# Patient Record
Sex: FEMALE | Race: WHITE
[De-identification: names, ages, dates, MRNs, and addresses within clinical notes are randomized per-mention and may not be internally consistent; named-entity substitution may affect disease eponyms.]

---

## 2017-01-03 ENCOUNTER — HOSPITAL ENCOUNTER (OUTPATIENT)
Dept: HOSPITAL 47 - LABWHC1 | Age: 22
Discharge: HOME | End: 2017-01-03
Payer: COMMERCIAL

## 2017-01-03 DIAGNOSIS — Z34.02: Primary | ICD-10-CM

## 2017-01-03 DIAGNOSIS — Z3A.00: ICD-10-CM

## 2017-01-03 LAB
CH: 29.5
CHCM: 32.9
ERYTHROCYTE [DISTWIDTH] IN BLOOD BY AUTOMATED COUNT: 3.51 M/UL (ref 3.8–5.4)
ERYTHROCYTE [DISTWIDTH] IN BLOOD: 12.5 % (ref 11.5–15.5)
HCT VFR BLD AUTO: 31.7 % (ref 34–46)
HDW: 2.81
HGB BLD-MCNC: 10.4 GM/DL (ref 11.4–16)
MCH RBC QN AUTO: 29.5 PG (ref 25–35)
MCHC RBC AUTO-ENTMCNC: 32.7 G/DL (ref 31–37)
MCV RBC AUTO: 90.3 FL (ref 80–100)
WBC # BLD AUTO: 9.3 K/UL (ref 4–11)

## 2017-01-03 PROCEDURE — 86870 RBC ANTIBODY IDENTIFICATION: CPT

## 2017-01-03 PROCEDURE — 85027 COMPLETE CBC AUTOMATED: CPT

## 2017-01-03 PROCEDURE — 86850 RBC ANTIBODY SCREEN: CPT

## 2017-01-03 PROCEDURE — 82950 GLUCOSE TEST: CPT

## 2017-01-03 PROCEDURE — 36415 COLL VENOUS BLD VENIPUNCTURE: CPT

## 2017-01-03 PROCEDURE — 86880 COOMBS TEST DIRECT: CPT

## 2017-03-24 ENCOUNTER — HOSPITAL ENCOUNTER (INPATIENT)
Dept: HOSPITAL 47 - 4FBP | Age: 22
LOS: 2 days | Discharge: HOME | End: 2017-03-26
Payer: COMMERCIAL

## 2017-03-24 VITALS — RESPIRATION RATE: 16 BRPM

## 2017-03-24 VITALS — BODY MASS INDEX: 27.4 KG/M2

## 2017-03-24 DIAGNOSIS — Z3A.40: ICD-10-CM

## 2017-03-24 DIAGNOSIS — O48.0: ICD-10-CM

## 2017-03-24 DIAGNOSIS — F32.9: ICD-10-CM

## 2017-03-24 DIAGNOSIS — F41.9: ICD-10-CM

## 2017-03-24 DIAGNOSIS — Z23: ICD-10-CM

## 2017-03-24 LAB
BASOPHILS # BLD AUTO: 0 K/UL (ref 0–0.2)
BASOPHILS NFR BLD AUTO: 0 %
CH: 27
CHCM: 32.3
EOSINOPHIL # BLD AUTO: 0.3 K/UL (ref 0–0.7)
EOSINOPHIL NFR BLD AUTO: 2 %
ERYTHROCYTE [DISTWIDTH] IN BLOOD BY AUTOMATED COUNT: 3.74 M/UL (ref 3.8–5.4)
ERYTHROCYTE [DISTWIDTH] IN BLOOD: 13.1 % (ref 11.5–15.5)
HCT VFR BLD AUTO: 31.4 % (ref 34–46)
HDW: 3.27
HGB BLD-MCNC: 10.2 GM/DL (ref 11.4–16)
LUC NFR BLD AUTO: 3 %
LYMPHOCYTES # SPEC AUTO: 2.6 K/UL (ref 1–4.8)
LYMPHOCYTES NFR SPEC AUTO: 24 %
MCH RBC QN AUTO: 27.2 PG (ref 25–35)
MCHC RBC AUTO-ENTMCNC: 32.4 G/DL (ref 31–37)
MCV RBC AUTO: 84 FL (ref 80–100)
MONOCYTES # BLD AUTO: 0.6 K/UL (ref 0–1)
MONOCYTES NFR BLD AUTO: 5 %
NEUTROPHILS # BLD AUTO: 6.8 K/UL (ref 1.3–7.7)
NEUTROPHILS NFR BLD AUTO: 65 %
WBC # BLD AUTO: 0.34 10*3/UL
WBC # BLD AUTO: 10.5 K/UL (ref 4–11)
WBC (PEROX): 11.5

## 2017-03-24 PROCEDURE — 10907ZC DRAINAGE OF AMNIOTIC FLUID, THERAPEUTIC FROM PRODUCTS OF CONCEPTION, VIA NATURAL OR ARTIFICIAL OPENING: ICD-10-PCS

## 2017-03-24 PROCEDURE — 90707 MMR VACCINE SC: CPT

## 2017-03-24 PROCEDURE — 3E033VJ INTRODUCTION OF OTHER HORMONE INTO PERIPHERAL VEIN, PERCUTANEOUS APPROACH: ICD-10-PCS

## 2017-03-24 PROCEDURE — 85025 COMPLETE CBC W/AUTO DIFF WBC: CPT

## 2017-03-24 PROCEDURE — 88307 TISSUE EXAM BY PATHOLOGIST: CPT

## 2017-03-24 PROCEDURE — 3E0S3NZ INTRODUCTION OF ANALGESICS, HYPNOTICS, SEDATIVES INTO EPIDURAL SPACE, PERCUTANEOUS APPROACH: ICD-10-PCS

## 2017-03-24 PROCEDURE — 0HQ9XZZ REPAIR PERINEUM SKIN, EXTERNAL APPROACH: ICD-10-PCS

## 2017-03-24 RX ADMIN — IBUPROFEN PRN MG: 600 TABLET ORAL at 17:54

## 2017-03-24 RX ADMIN — POTASSIUM CHLORIDE SCH MLS/HR: 14.9 INJECTION, SOLUTION INTRAVENOUS at 12:00

## 2017-03-24 RX ADMIN — POTASSIUM CHLORIDE SCH MLS/HR: 14.9 INJECTION, SOLUTION INTRAVENOUS at 10:44

## 2017-03-24 RX ADMIN — POTASSIUM CHLORIDE SCH MLS/HR: 14.9 INJECTION, SOLUTION INTRAVENOUS at 09:54

## 2017-03-24 RX ADMIN — DOCUSATE SODIUM AND SENNOSIDES SCH EACH: 50; 8.6 TABLET ORAL at 20:34

## 2017-03-24 RX ADMIN — POTASSIUM CHLORIDE SCH MLS/HR: 14.9 INJECTION, SOLUTION INTRAVENOUS at 06:46

## 2017-03-24 NOTE — P.PROBDLV
Vaginal Delivery Note





- .


Vaginal Delivery Note: 





Patient progressed to complete and pushing with spontaneous vaginal delivery of 

a viable male  over an intact perineum.  Following delivery of the head 

that was delivered from left occiput anterior position, it had rotated from 

essentially asynclitic position to left occiput anterior at the very end of the 

delivery.  Anterior and posterior shoulders were delivered with gentle downward 

and upward traction followed by the remainder of the baby.  Mouth nares were 

then bulb suctioned and baby was placed on mother's abdomen where the umbilical 

cord was allowed to pulsate for 30 seconds prior to clamping and cutting.  

Placenta was then delivered intact and Pitocin was added to the IV.  Nursery 

personnel was present to assume care.  Inspection of the vagina revealed a 

large avulsion of the left labia which was reapproximated with 3-0 Vicryl.  

Once this was accomplished both mother and baby were noted to be stable.  Apgar 

scores were 8 and 9 at one and 5 minutes respectively and the weight was 9 lbs. 

12 oz.

## 2017-03-24 NOTE — P.HPOB
History of Present Illness


H&P Date: 17


Chief Complaint: Pregnancy at term: Induction of labor





Aileen is a 22-year-old  at 40 weeks 3 days gestation arise for induction 

of labor.  Her pregnancy course has been unremarkable.  She is feeling well at 

this time.  Risks of induction were discussed the patient in detail.  Pertinent 

labs did include a negative blood type she did receive [place] gamma at 28 weeks.  

Rubella nonimmune.  Hepatitis B surface antigen and RPR were negative.  Group B 

strep was negative.


Vital signs are currently stable and afebrile.  Heart regular, lungs clear, 

extremities without pain.  Gravid uterus is noted.  Fetal heart tones are in 

the 140s and reactive.  She was dilated 3 Cm 80% effaced -2


 station.  Artificial rupture membranes was performed and clear fluid is noted.


Assessment intrauterine pregnancy 40 weeks gestation.  Plan expect spontaneous 

vaginal delivery.  She anticipates use of IV pain medication for pain control.





Past Medical History


Past Medical History: No Reported History


History of Any Multi-Drug Resistant Organisms: None Reported


Past Surgical History: No Surgical Hx Reported


Past Anesthesia/Blood Transfusion Reactions: Unable to Obtain


Past Psychological History: Anxiety, Depression


Smoking Status: Never smoker


Past Alcohol Use History: None Reported





- Past Family History


  ** Mother


Family Medical History: No Reported History





Medications and Allergies


 Home Medications











 Medication  Instructions  Recorded  Confirmed  Type


 


Pediatric Multivit Comb #25/FA 2 tab PO DAILY 16 History





[Flintstones Multivit Chew Tab]    











 Allergies











Allergy/AdvReac Type Severity Reaction Status Date / Time


 


No Known Allergies Allergy   Verified 17 06:39














Exam


Osteopathic Statement: *.  No significant issues noted on an osteopathic 

structural exam other than those noted in the History and Physical/Consult.





- Vital Signs


Vital signs: 


 Intake and Output











 17





 22:59 06:59 14:59


 


Other:   


 


  Weight  74.843 kg 














- OBG Physical Exam


Abdomen: bowel sounds normal, no diffuse tenderness, no bruit present, no 

guarding noted, no hepatomegaly, no splenomegaly, no mass


Vulva: both: normal


Vagina: normal moisture, no discharge


Cervix: no lesion, no discharge


Uterus: normal size, normal contour


Adnexa: both: normal





Results


Result Diagrams: 


 17 06:45





 Abnormal Lab Results - Last 24 Hours (Table)











  17 Range/Units





  06:45 


 


RBC  3.74 L  (3.80-5.40)  m/uL


 


Hgb  10.2 L  (11.4-16.0)  gm/dL


 


Hct  31.4 L  (34.0-46.0)  %

## 2017-03-25 PROCEDURE — 3E0134Z INTRODUCTION OF SERUM, TOXOID AND VACCINE INTO SUBCUTANEOUS TISSUE, PERCUTANEOUS APPROACH: ICD-10-PCS

## 2017-03-25 RX ADMIN — IBUPROFEN PRN MG: 600 TABLET ORAL at 11:35

## 2017-03-25 RX ADMIN — IBUPROFEN PRN MG: 600 TABLET ORAL at 19:33

## 2017-03-25 RX ADMIN — DOCUSATE SODIUM AND SENNOSIDES SCH: 50; 8.6 TABLET ORAL at 12:34

## 2017-03-25 NOTE — P.PNOBGVD
Subjective





- Subjective


Principal diagnosis: Postpartum day 1


Interval history: 





Patient is doing very well postpartum day 1.  She is involuting, voiding, and 

she is tolerating her diet.  She voices no complaints.


Patient reports: Reports appetite normal, Reports voiding normally, Reports 

pain well controlled, Reports ambulating normally


[place]: doing well





Objective





- Latest Vital Signs


Latest vital signs: 


 Vital Signs











  Temp Pulse Resp BP


 


 17 08:00  97.6 F  82  16  115/63


 


 17 04:00  98.6 F  83  16  90/52


 


 17 00:00  98.2 F  80  16  103/64


 


 17 21:45    16 


 


 17 19:39  98.8 F  97  16  121/64


 


 17 19:09  98 F  102 H  16  131/71


 


 17 18:39   117 H  16  125/66


 


 17 18:24   103 H  16  131/72


 


 17 18:09   107 H  16  127/71


 


 17 17:54   109 H  16  125/66


 


 17 17:39   113 H  16  123/61








 Intake and Output











 17





 22:59 06:59 14:59


 


Intake Total 110.583 100 


 


Balance 110.583 100 


 


Intake:   


 


  Intake, IV Titration 110.583  





  Amount   


 


    Oxytocin 30 Units/500 ml 110.583  





    Ns 30 unit In Saline 1   





    500ml.bag @ 1 MILLIUNIT/   





    MIN 1 mls/hr IV .Q24H ERIC   





    Rx#:[id number]   


 


  Oral  100 


 


Other:   


 


  # Voids 1  2














- Exam


Lungs: bilateral: normal


Chest: Normal S1, Normal S2


Extremities: Present: normal


Abdomen: Present: normal appearance, soft


Uterus: Present: normal, firm

## 2017-03-26 VITALS — DIASTOLIC BLOOD PRESSURE: 87 MMHG | HEART RATE: 89 BPM | TEMPERATURE: 98.5 F | SYSTOLIC BLOOD PRESSURE: 112 MMHG

## 2017-03-26 NOTE — P.DS
Providers


Date of admission: 


03/24/17 06:25





Expected date of discharge: 03/26/17


Attending physician: 


Reji Stearns





Primary care physician: 


Stated None





Hospital Course: 





Negative doing very well postpartum day 2.  She is ambulating, voiding, and she 

is tolerating her diet.  She voices no complaints.  Vital signs stable 

afebrile.  Discharge instructions were thoroughly reviewed and all questions 

are answered for her prior to her discharge.  Prescription for Motrin and a 

breast pump has been provided.  Her heart is regular, lungs are clear, 

extremities without pain.  Abdomen is soft uterus is firm below the umbilicus 

and lochia is reported to be light.  Assessment postpartum day 2.  Plan 

discharged home follow up with me in 6 weeks.


Patient Condition at Discharge: Good





Plan - Discharge Summary


New Discharge Prescriptions: 


Ibuprofen [Motrin] 600 mg PO Q6HR PRN #30 tab


 PRN Reason: Pain


Discharge Medication List





Pediatric Multivit Comb #25/FA [Flintstones Multivit Chew Tab] 2 tab PO DAILY 12 /20/16 [History]


Ibuprofen [Motrin] 600 mg PO Q6HR PRN #30 tab 03/26/17 [Rx]








Follow up Appointment(s)/Referral(s): 


Reji Stearns DO [Doctor of Osteopathic Medicine] - 6 Weeks


Activity/Diet/Wound Care/Special Instructions: 


No heavy lifting, limit stairs and driving, and pelvic rest.  If any high 

temperatures, heavy bleeding, or severe pain call my office


Discharge Disposition: HOME SELF-CARE

## 2018-07-14 ENCOUNTER — HOSPITAL ENCOUNTER (EMERGENCY)
Dept: HOSPITAL 47 - EC | Age: 23
LOS: 1 days | Discharge: HOME | End: 2018-07-15
Payer: COMMERCIAL

## 2018-07-14 VITALS
RESPIRATION RATE: 16 BRPM | SYSTOLIC BLOOD PRESSURE: 113 MMHG | DIASTOLIC BLOOD PRESSURE: 76 MMHG | TEMPERATURE: 97.9 F | HEART RATE: 84 BPM

## 2018-07-14 DIAGNOSIS — L50.9: Primary | ICD-10-CM

## 2018-07-14 PROCEDURE — 99282 EMERGENCY DEPT VISIT SF MDM: CPT

## 2018-07-15 NOTE — ED
General Adult HPI





- General


Chief complaint: Skin/Abscess/Foreign Body


Stated complaint: Rash


Time Seen by Provider: 07/14/18 23:47


Source: patient, RN notes reviewed


Mode of arrival: ambulatory


Limitations: no limitations





- History of Present Illness


Initial comments: 





Chief complaint and history of present illness is a 23-year-old female here 

with a friend.  The patient worsen 1 PM this afternoon she awakened she had 

some itchy hives across her lower abdomen on on her right lateral thigh.  

Patient denies any new foods, denies any new detergents or clothing.  No 

difficulty breathing.  She did take 25 mg Benadryl by mouth which helped 

slightly.





- Related Data


 Home Medications











 Medication  Instructions  Recorded  Confirmed


 


Pedi Multivit No.25/Folic Acid 2 tab PO DAILY 12/20/16 03/24/17





[Flintstones Multivit Chew Tab]   








 Previous Rx's











 Medication  Instructions  Recorded


 


Ibuprofen [Motrin] 600 mg PO Q6HR PRN #30 tab 03/26/17


 


predniSONE 20 mg PO DAILY #3 tab 07/15/18











 Allergies











Allergy/AdvReac Type Severity Reaction Status Date / Time


 


No Known Allergies Allergy   Verified 07/14/18 23:45














Review of Systems


ROS Statement: 


Those systems with pertinent positive or pertinent negative responses have been 

documented in the HPI.


review of systems.  The patient denies any headache no itchy eyes no visual 

acuity changes no shortness of breath no wheezing.  No GI/ problems or nausea 

no vomiting diarrhea no diarrhea.  Patient denies any chest pain pregnant.  

Patient presents with urticaria over the lower abdomen and right lateral hip 

area.





  All systems reviewed.  Patient denies any chronic medical problems.  

Nonsmoker nondrinker.  Denies any chance being pregnant.Family history 

noncontributory.


ROS Other: All systems not noted in ROS Statement are negative.





Past Medical History


Past Medical History: No Reported History


History of Any Multi-Drug Resistant Organisms: None Reported


Past Surgical History: No Surgical Hx Reported


Past Anesthesia/Blood Transfusion Reactions: Unable to Obtain


Past Psychological History: Anxiety, Depression


Smoking Status: Never smoker


Past Alcohol Use History: Rare


Past Drug Use History: None Reported





- Past Family History


  ** Mother


Family Medical History: No Reported History





General Exam





- General Exam Comments


Initial Comments: 





General:


The patient is awake and alert, here because she has hives, there it she.  

Preventing her from sleeping.  The patient did take Benadryl 25 mg this 

evening.  Vital signs shows temperature 97.9 pulse 84 respiratory rate 16 pulse 

ox 99% room air blood pressure 113/76.


Eye:


   extra-ocular movements are intact; there is normal conjunctiva bilaterally. 

No signs of icterus. 


Ears, nose, mouth and throat:


There are moist mucous membranes  


Neck:


The neck is supple, no stridor


Cardiovascular:


regular heart rate and rhythm, no murmur.


Respiratory:


Lungs are clear to auscultation, respirations are non-labored, breath sounds 

are equal. No wheezes, stridor, rales, or rhonchi.


Gastrointestinal:


patient presents with hives over the area where her belt would go.  Denies any 

new clothing or chemicals that may have caused local irritation.


 


Skin:


hives noted lateral right thigh.


 


Limitations: no limitations





Course





 Vital Signs











  07/14/18





  23:40


 


Temperature 97.9 F


 


Pulse Rate 84


 


Respiratory 16





Rate 


 


Blood Pressure 113/76


 


O2 Sat by Pulse 99





Oximetry 














Medical Decision Making





- Medical Decision Making





medical decision making; the patient is 23 years old here with a friend.  

Patient has urticaria over her lower abdomen and right lateral thigh area.  No 

cause can be determined at this time.  The patient will be placed on Benadryl, 

prednisone and Pepcid for 3 days.  Advised to review what she is eaten, which 

is washed her clothing and an put on her skin.  Advised return emergency room 

if she has any difficulties otherwise follow-up with family physician.





Disposition


Clinical Impression: 


 Urticaria





Disposition: HOME SELF-CARE


Condition: Stable


Instructions:  Urticaria (ED)


Additional Instructions: 


Do not take a hot shower as this will make the urticaria worse.  Take Benadryl 

50 mg 3 times daily for the next 3 days.  Take prednisone 20 mg once daily as 

directed for the next 3 days.  Also Pepcid 20 mg daily for the next 3 days.  

Return emergency room for development of difficulty breathing.  Otherwise follow

-up with family physician.


Prescriptions: 


predniSONE 20 mg PO DAILY #3 tab


Is patient prescribed a controlled substance at d/c from ED?: No


Referrals: 


None,Stated [Primary Care Provider] - 1-2 days


Time of Disposition: 00:08

## 2020-01-27 ENCOUNTER — HOSPITAL ENCOUNTER (INPATIENT)
Dept: HOSPITAL 47 - 4FBP | Age: 25
LOS: 1 days | Discharge: HOME | End: 2020-01-28
Attending: OBSTETRICS & GYNECOLOGY | Admitting: OBSTETRICS & GYNECOLOGY
Payer: COMMERCIAL

## 2020-01-27 VITALS — RESPIRATION RATE: 16 BRPM

## 2020-01-27 DIAGNOSIS — Z86.59: ICD-10-CM

## 2020-01-27 DIAGNOSIS — O26.893: Primary | ICD-10-CM

## 2020-01-27 DIAGNOSIS — Z67.11: ICD-10-CM

## 2020-01-27 DIAGNOSIS — Z3A.39: ICD-10-CM

## 2020-01-27 LAB
BASOPHILS # BLD AUTO: 0.1 K/UL (ref 0–0.2)
BASOPHILS NFR BLD AUTO: 1 %
EOSINOPHIL # BLD AUTO: 0.3 K/UL (ref 0–0.7)
EOSINOPHIL NFR BLD AUTO: 3 %
ERYTHROCYTE [DISTWIDTH] IN BLOOD BY AUTOMATED COUNT: 3.83 M/UL (ref 3.8–5.4)
ERYTHROCYTE [DISTWIDTH] IN BLOOD: 13.7 % (ref 11.5–15.5)
HCT VFR BLD AUTO: 29.7 % (ref 34–46)
HGB BLD-MCNC: 9.1 GM/DL (ref 11.4–16)
LYMPHOCYTES # SPEC AUTO: 2.4 K/UL (ref 1–4.8)
LYMPHOCYTES NFR SPEC AUTO: 21 %
MCH RBC QN AUTO: 23.7 PG (ref 25–35)
MCHC RBC AUTO-ENTMCNC: 30.6 G/DL (ref 31–37)
MCV RBC AUTO: 77.6 FL (ref 80–100)
MONOCYTES # BLD AUTO: 0.6 K/UL (ref 0–1)
MONOCYTES NFR BLD AUTO: 6 %
NEUTROPHILS # BLD AUTO: 7.5 K/UL (ref 1.3–7.7)
NEUTROPHILS NFR BLD AUTO: 67 %
PLATELET # BLD AUTO: 386 K/UL (ref 150–450)
WBC # BLD AUTO: 11.3 K/UL (ref 3.8–10.6)

## 2020-01-27 PROCEDURE — 00HU33Z INSERTION OF INFUSION DEVICE INTO SPINAL CANAL, PERCUTANEOUS APPROACH: ICD-10-PCS

## 2020-01-27 PROCEDURE — 86900 BLOOD TYPING SEROLOGIC ABO: CPT

## 2020-01-27 PROCEDURE — 86901 BLOOD TYPING SEROLOGIC RH(D): CPT

## 2020-01-27 PROCEDURE — 10907ZC DRAINAGE OF AMNIOTIC FLUID, THERAPEUTIC FROM PRODUCTS OF CONCEPTION, VIA NATURAL OR ARTIFICIAL OPENING: ICD-10-PCS

## 2020-01-27 PROCEDURE — 3E033VJ INTRODUCTION OF OTHER HORMONE INTO PERIPHERAL VEIN, PERCUTANEOUS APPROACH: ICD-10-PCS

## 2020-01-27 PROCEDURE — 86850 RBC ANTIBODY SCREEN: CPT

## 2020-01-27 PROCEDURE — 85025 COMPLETE CBC W/AUTO DIFF WBC: CPT

## 2020-01-27 PROCEDURE — 3E0234Z INTRODUCTION OF SERUM, TOXOID AND VACCINE INTO MUSCLE, PERCUTANEOUS APPROACH: ICD-10-PCS

## 2020-01-27 PROCEDURE — 3E0R3BZ INTRODUCTION OF ANESTHETIC AGENT INTO SPINAL CANAL, PERCUTANEOUS APPROACH: ICD-10-PCS

## 2020-01-27 RX ADMIN — DOCUSATE SODIUM AND SENNOSIDES SCH: 50; 8.6 TABLET ORAL at 22:43

## 2020-01-27 RX ADMIN — POTASSIUM CHLORIDE SCH MLS/HR: 14.9 INJECTION, SOLUTION INTRAVENOUS at 08:00

## 2020-01-27 RX ADMIN — POTASSIUM CHLORIDE SCH: 14.9 INJECTION, SOLUTION INTRAVENOUS at 22:43

## 2020-01-28 VITALS — TEMPERATURE: 98.6 F | SYSTOLIC BLOOD PRESSURE: 101 MMHG | DIASTOLIC BLOOD PRESSURE: 61 MMHG | HEART RATE: 85 BPM

## 2020-01-28 LAB
BASOPHILS # BLD AUTO: 0.2 K/UL (ref 0–0.2)
BASOPHILS NFR BLD AUTO: 1 %
EOSINOPHIL # BLD AUTO: 0.3 K/UL (ref 0–0.7)
EOSINOPHIL NFR BLD AUTO: 2 %
ERYTHROCYTE [DISTWIDTH] IN BLOOD BY AUTOMATED COUNT: 3.91 M/UL (ref 3.8–5.4)
ERYTHROCYTE [DISTWIDTH] IN BLOOD: 13.3 % (ref 11.5–15.5)
HCT VFR BLD AUTO: 31 % (ref 34–46)
HGB BLD-MCNC: 9.2 GM/DL (ref 11.4–16)
LYMPHOCYTES # SPEC AUTO: 2.8 K/UL (ref 1–4.8)
LYMPHOCYTES NFR SPEC AUTO: 19 %
MCH RBC QN AUTO: 23.4 PG (ref 25–35)
MCHC RBC AUTO-ENTMCNC: 29.5 G/DL (ref 31–37)
MCV RBC AUTO: 79.3 FL (ref 80–100)
MONOCYTES # BLD AUTO: 0.7 K/UL (ref 0–1)
MONOCYTES NFR BLD AUTO: 5 %
NEUTROPHILS # BLD AUTO: 10.6 K/UL (ref 1.3–7.7)
NEUTROPHILS NFR BLD AUTO: 71 %
PLATELET # BLD AUTO: 386 K/UL (ref 150–450)
WBC # BLD AUTO: 14.8 K/UL (ref 3.8–10.6)

## 2020-01-28 RX ADMIN — DOCUSATE SODIUM AND SENNOSIDES SCH EACH: 50; 8.6 TABLET ORAL at 07:42

## 2020-01-28 RX ADMIN — POTASSIUM CHLORIDE SCH: 14.9 INJECTION, SOLUTION INTRAVENOUS at 05:32

## 2020-01-28 NOTE — P.DS
Providers


Date of admission: 


01/27/20 06:52





Expected date of discharge: 01/28/20


Attending physician: 


Reji Stearns





Primary care physician: 


Stated None





Hospital Course: 





Postpartum day 1.  Aileen is doing very well postpartum day 1.  She is a

mbulating, voiding tolerating her diet.  She voices no complaints and is 

requesting discharge to home today.  Vital signs are stable and afebrile.  Heart

regular, lungs clear, extremities without pain.  Abdomen soft uterus is firm and

lochia is reported to be light.  Assessment postpartum day 1.  Plan discharged 

home follow up with me in 6 weeks.  Discharge instructions were thoroughly 

reviewed and all questions were answered for her prior to her discharge.  She is

stable for discharge this time.


Patient Condition at Discharge: Good





Plan - Discharge Summary


New Discharge Prescriptions: 


No Action


   No Known Home Medications 


Discharge Medication List





No Known Home Medications  01/27/20 [History]








Follow up Appointment(s)/Referral(s): 


Reji Stearns DO [Doctor of Osteopathic Medicine] - 6 Weeks


Activity/Diet/Wound Care/Special Instructions: 


No heavy lifting, limit stairs and driving, and pelvic rest.  If any high 

temperatures, heavy bleeding, or severe pain call my office


Discharge Disposition: HOME SELF-CARE

## 2020-06-14 ENCOUNTER — HOSPITAL ENCOUNTER (EMERGENCY)
Dept: HOSPITAL 47 - EC | Age: 25
LOS: 1 days | Discharge: HOME | End: 2020-06-15
Payer: COMMERCIAL

## 2020-06-14 DIAGNOSIS — S06.9X1A: ICD-10-CM

## 2020-06-14 DIAGNOSIS — Z32.02: ICD-10-CM

## 2020-06-14 DIAGNOSIS — R42: ICD-10-CM

## 2020-06-14 DIAGNOSIS — R55: Primary | ICD-10-CM

## 2020-06-14 DIAGNOSIS — M54.2: ICD-10-CM
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
## 2020-06-14 LAB — GLUCOSE BLD-MCNC: 91 MG/DL (ref 75–99)

## 2020-06-14 PROCEDURE — 87086 URINE CULTURE/COLONY COUNT: CPT

## 2020-06-14 PROCEDURE — 99285 EMERGENCY DEPT VISIT HI MDM: CPT

## 2020-06-14 PROCEDURE — 93005 ELECTROCARDIOGRAM TRACING: CPT

## 2020-06-14 PROCEDURE — 70450 CT HEAD/BRAIN W/O DYE: CPT

## 2020-06-14 PROCEDURE — 36415 COLL VENOUS BLD VENIPUNCTURE: CPT

## 2020-06-14 PROCEDURE — 81001 URINALYSIS AUTO W/SCOPE: CPT

## 2020-06-14 PROCEDURE — 81025 URINE PREGNANCY TEST: CPT

## 2020-06-14 PROCEDURE — 72125 CT NECK SPINE W/O DYE: CPT

## 2020-06-15 VITALS
SYSTOLIC BLOOD PRESSURE: 107 MMHG | HEART RATE: 83 BPM | RESPIRATION RATE: 16 BRPM | TEMPERATURE: 97.5 F | DIASTOLIC BLOOD PRESSURE: 62 MMHG

## 2020-06-15 LAB
PH UR: 6 [PH] (ref 5–8)
RBC UR QL: 3 /HPF (ref 0–5)
SP GR UR: 1.03 (ref 1–1.03)
SQUAMOUS UR QL AUTO: 20 /HPF (ref 0–4)
UROBILINOGEN UR QL STRIP: <2 MG/DL (ref ?–2)
WBC # UR AUTO: 20 /HPF (ref 0–5)

## 2020-06-15 NOTE — ED
General Adult HPI





- General


Chief complaint: Syncope


Stated complaint: Syncope, Fall


Time Seen by Provider: 06/14/20 23:32


Source: patient, RN notes reviewed, old records reviewed


Mode of arrival: ambulatory


Limitations: no limitations





- History of Present Illness


Initial comments: 


24-year-old female patient presents to ED for evaluation of syncopal episode.  

Patient reports that she was outside when she felt dizzy, she went to step in 

and then she fell backwards.  She hit her head on the ground and had a reported 

loss of consciousness of around 10 seconds.  Patient then came the hospital.  

Patient is complaining of pain in the back of her head and neck pain.  Denies 

any other complaints.





Systemic: Pt denies fatigue, fever/chills, rash. Pt denies weakness, night 

sweats, weight loss. 


Neuro: Pt denies visual disturbances.


HEENT: Pt denies ocular discharge or irritation, otalgia, rhinorrhea, 

pharyngitis or notable lymphadenopathy. 


Cardiopulmonary: Pt denies chest pain, SOB, heart palpitations, dyspnea on 

exertion.  


Abdominal/GI: Pt denies abdominal pain, n/v/d. 


: Pt denies dysuria, burning w/ urination, frequency/urgency. Denies new onset

urinary or bowel incontinence.  


MSK: Pt denies myalgia, loss of strength or function in extremities. 


Neuro: Pt denies new onset weakness, paresthesias. 











- Related Data


                                Home Medications











 Medication  Instructions  Recorded  Confirmed


 


No Known Home Medications  01/27/20 01/27/20











                                    Allergies











Allergy/AdvReac Type Severity Reaction Status Date / Time


 


No Known Allergies Allergy   Verified 06/14/20 23:30














Review of Systems


ROS Statement: 


Those systems with pertinent positive or pertinent negative responses have been 

documented in the HPI.





ROS Other: All systems not noted in ROS Statement are negative.





Past Medical History


Past Medical History: No Reported History


History of Any Multi-Drug Resistant Organisms: None Reported


Past Surgical History: No Surgical Hx Reported


Past Anesthesia/Blood Transfusion Reactions: No Reported Reaction


Past Psychological History: Anxiety, Depression


Smoking Status: Never smoker


Past Alcohol Use History: Occasional


Past Drug Use History: None Reported





- Past Family History


  ** Mother


Family Medical History: No Reported History





General Exam





- General Exam Comments


Initial Comments: 





Constitutional: NAD, AOX3, Pt has pleasant affect. 


HEENT: NC/AT, trachea midline. External ears appear normal, without discharge. 

Mucous membranes moist. Eyes PERRLA, EOM intact. There is no scleral icterus. No

pallor noted. 


Cardiopulmonary: RRR, no murmurs, rubs or gallops, no JVD noted. Lungs CTAB in 

anterior and posterior fields. No peripheral edema. 


Abdominal exam: Abdomen soft and non-distended. Abdomen non-tender to palpation 

in all 4 quadrants. Bowel sounds active in LLQ. No hepatosplenomegaly. No 

ecchymosis


Neuro: CN II-XII intact. No nuchal rigidity. No raccon eyes, no ladd sign, no 

hemotympanum. Mild amount of paracervical tenderness. NIH 0. 


MSK: No posterior calf tenderness bilaterally, homans sign negative bilaterally.

Posterior tibialis and radial pulse +2 bilaterally. Sensation intact in upper 

and lower extremities. Full active ROM in upper and lower extremities, 5/5 

stregnth. 








Limitations: no limitations





Course


                                   Vital Signs











  06/14/20 06/15/20





  23:22 01:44


 


Temperature 98.5 F 97.5 F L


 


Pulse Rate 74 83


 


Respiratory 18 16





Rate  


 


Blood Pressure 109/75 107/62


 


O2 Sat by Pulse 100 95





Oximetry  














Medical Decision Making





- Medical Decision Making








24-year-old female patient presents to ED for chief complaint of fall with 

approximately 10 second loss of consciousness.  Patient's complaints of pain on 

the back of her head where she had the concrete and neck pain.  Patient vital 

signs are stable, afebrile.  Physical exam displayed mild amount of paracervical

discomfort.  Neurologic exam is intact.  CT brain C-spine was obtained.  This is

negative for acute process.  Urinalysis displays moderate leukocyte esterase, 20

white blood cells 20 squamous cells.  HCG is negative.  Patient denies any 

urinary symptoms.  His urine will be cultured.  EKG displayed normal sinus 

rhythm.  Patient is asymptomatic.  Ambulatory without difficulty.  Patient also 

reports that she has some very mild right elbow discomfort.  She has full active

range of motion.  She believes it is minor and she is declining imaging.  

Patient will be discharged will follow up with primary care provider tomorrow 

and will return to ER if condition worsens.  Case discussed with Dr. Main. 





- Lab Data


                                   Lab Results











  06/14/20 06/14/20 06/14/20 Range/Units





  23:45 23:45 23:47 


 


POC Glucose (mg/dL)    91  (75-99)  mg/dL


 


POC Glu Operater ID    Klaudia Miles  


 


Urine Color  Yellow    


 


Urine Appearance  Cloudy H    (Clear)  


 


Urine pH  6.0    (5.0-8.0)  


 


Ur Specific Gravity  1.028    (1.001-1.035)  


 


Urine Protein  Trace H    (Negative)  


 


Urine Glucose (UA)  Negative    (Negative)  


 


Urine Ketones  Negative    (Negative)  


 


Urine Blood  Small H    (Negative)  


 


Urine Nitrite  Negative    (Negative)  


 


Urine Bilirubin  Negative    (Negative)  


 


Urine Urobilinogen  <2.0    (<2.0)  mg/dL


 


Ur Leukocyte Esterase  Moderate H    (Negative)  


 


Urine RBC  3    (0-5)  /hpf


 


Urine WBC  20 H    (0-5)  /hpf


 


Ur Squamous Epith Cells  20 H    (0-4)  /hpf


 


Urine Bacteria  Rare H    (None)  /hpf


 


Urine Mucus  Many H    (None)  /hpf


 


Urine Sperm  Rare    (None)  /hpf


 


Urine HCG, Qual   Not Detected   (Not Detectd)  














- EKG Data


-: EKG Interpreted by Me (and Dr. Main )


EKG Comments: 


Ventricular rate 74, Trental 152, , QT//435.  Normal sensory 

rhythm, possible left atrial enlargement.  Incomplete right bundle-branch block.

 Borderline EKG.  No concern for acute ischemia or arrhythmia at this time.








Disposition


Clinical Impression: 


 Fall, Syncopal episodes





Disposition: HOME SELF-CARE


Condition: Stable


Instructions (If sedation given, give patient instructions):  Syncope (ED)


Additional Instructions: 


Follow-up with primary care provider tomorrow.  Return to ER if condition 

worsens in any way.


Is patient prescribed a controlled substance at d/c from ED?: No


Referrals: 


None,Stated [Primary Care Provider] - 1-2 days


Fuentes Puckett [STAFF PHYSICIAN] - 1-2 days

## 2020-06-15 NOTE — CT
EXAMINATION TYPE: CT brain cspine wo con

 

DATE OF EXAM: 6/15/2020

 

COMPARISON: None

 

HISTORY: fall

 

CT DLP: 1296.4 mGycm

Automated exposure control for dose reduction was used.

 

Images were obtained of the brain without contrast. Images were obtained from the skull base to T1 ve
rtebra without contrast.

 

FINDINGS:

Ventricles and sulci appear normal. There is no mass effect nor midline shift. There is no sign of in
tracranial hemorrhage. Calvarium is intact. There is no evidence of cerebral edema. The skull base is
 intact. Temporal bones appear normal.

 

Cervical vertebra have fairly normal spacing and alignment. Posterior elements are intact. Facet join
ts appear normal. I see no bony destructive process.

 

IMPRESSION:

Negative CT scan of the brain.

 

Negative CT scan of the cervical spine.

## 2020-09-15 NOTE — P.PROBDLV
9/15/2020         RE: Alba Bhardwaj  [address] [facility] [id number]        Dear Colleague,    Thank you for referring your patient, Alba Bhardwaj, to the [facility]. Please see a copy of my visit note below.    Alba Bhardwaj  :  2006  DOS: 09/15/20  MRN: [id number]    Sports Medicine Clinic Visit    PCP: Alyce Parker    Alba Bhardwaj is a 14  year old 0  month old Right hand dominant female who is seen as an ER referral presenting with right middle finger injury.    Injury: Riding scooter, fell, landing on right hand ~ 5 days ago (20).  Pain located over right middle finger, proximal phalanx, nonradiating.  Additional Features:  Positive: swelling, bruising and weakness.  Symptoms are better with Ibuprofen, Rest and splint.  Symptoms are worse with: finger flexion, direct pressure.  Other evaluation and/or treatments so far consists of: Ice, Ibuprofen, Rest and ER visit, alumofoam splint.  Recent imaging completed: X-rays completed 20.  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  Since last visit on 20 patient denies swelling, pain or paresthesias, splint removed and repeat radiograph taken prior to seeing the patient.        Review of Systems  Musculoskeletal: as above  Remainder of review of systems is negative including constitutional, CV, pulmonary, GI, Skin and Neurologic except as noted in HPI or medical history.    Past Medical History:   Diagnosis Date     NO ACTIVE PROBLEMS      No past surgical history on file.  Family History   Problem Relation Age of Onset     Hypertension Paternal Grandmother      Diabetes Paternal Grandfather      Diabetes Sister         type 1     Asthma No family hx of      C.A.D. No family hx of      Cerebrovascular Disease No family hx  Vaginal Delivery Note





- .


Vaginal Delivery Note: 





Aileen progressed complete and pushed with spontaneous vaginal delivery of a 

viable male  over an intact perineum.  Falling deliver the head from left

occiput anterior position anterior posterior shoulders were easily delivered 

with gentle upward and downward motion followed by the remainder the baby.  

Mouth nares were then bulb suctioned and baby was placed mother's abdomen where 

the umbilical cord was allowed to pulsate for 30 seconds prior to clamping and c

utting.  Nursery personnel was present and assumed care.  Placenta was then 

delivered intact following collection of blood for Rh antibody.  Apgar scores 

are 9 and 9 at one and 5 minutes Young and weight is pending but both mother 

and baby currently appear stable. "of      Breast Cancer No family hx of      Cancer - colorectal No family hx of      Prostate Cancer No family hx of      Objective  /66   Ht 1.416 m (4' 7.75\")   Wt 39.9 kg (88 lb)   BMI 19.91 kg/m        General: healthy, alert and in no distress      HEENT: no scleral icterus or conjunctival erythema     Skin: no suspicious lesions or rash. No jaundice.     CV: regular rhythm by palpation, 2+ distal pulses, no pedal edema      Resp: normal respiratory effort without conversational dyspnea     Psych: normal mood and affect      Gait: nonantalgic, appropriate coordination and balance     Neuro: normal light touch sensory exam of the extremities. Motor strength as noted below     Right Wrist and Hand exam    Inspection:       Swelling with bruising centered over the 3rd MCP joint, dorsal and palmar, improving    Tender:       MCP joint of 3rd digit(s)  right and      Proximal phalanx, proximal aspect, 3rd digit, both improved    Non Tender:       Remainder of the Wrist and Hand right    ROM:       Limited ROM of 3rd digit due to stiffness and residual swelling, improving, otherwise normal    Strength:       Motor function intact    Neurovascular:       2+ radial pulses bilaterally with brisk capillary refill and      normal sensation to light touch in the radial, median and ulnar nerve distributions      Radiology:  Recent Results (from the past 744 hour(s))   XR Hand Right G/E 3 Views    Narrative    EXAM: XR HAND RT G/E 3 VW  LOCATION: [facility]  DATE/TIME: 8/24/2020 7:36 PM    INDICATION: Right hand pain after falling off a scooter.  COMPARISON: None.      Impression    IMPRESSION:  Minimally angulated, transverse fracture in the proximal metaphysis of the proximal phalanx in the right third finger. The fracture probably extends to the physis as a Salter-Curiel II fracture. This is best visible on the oblique projection. Moderate   soft tissue swelling throughout the third finger. The " remainder of the right hand is unremarkable. No fracture elsewhere. Normal joint alignment.   XR Finger Right G/E 2 Views    Narrative    XR RIGHT FINGER TWO OR MORE VIEWS  8/29/2020 11:10 AM    HISTORY: Closed nondisplaced fracture of proximal phalanx of right  middle finger with routine healing, subsequent encounter.    COMPARISON: 8/24/2020      Impression    IMPRESSION:  Redemonstrated is a minimally angulated, mildly displaced  Salter-Curiel II fracture long finger proximal phalangeal base.  Alignment unchanged. No significant interval healing. Persistent  moderate proximal left long finger soft tissue swelling. Joint spaces  are normal. Skeletally immature.     RIZWAN MURGUIA MD         Assessment:  1. Closed nondisplaced fracture of proximal phalanx of right middle finger with routine healing, subsequent encounter        Plan:  Discussed the assessment with the patient.  Follow up: 3 weeks prn  Radial gutter orthoglass splint discontinued, switch to erma taping full-time and then for activity only for the next 3 weeks  XR images independently visualized and reviewed with patient today in clinic  Repeat imaging shows ongoing good alignment, no significant changes in position, some signs of healing present today  Letter updated for school  4-6 week recovery total expected  Supportive care reviewed  All questions were answered today  Contact us with additional questions or concerns  Signs and sx of concern reviewed      Umesh Mccray DO, MISTY  Primary Care Sports Medicine  [facility]             Disclaimer: This note consists of symbols derived from keyboarding, dictation and/or voice recognition software. As a result, there may be errors in the script that have gone undetected. Please consider this when interpreting information found in this chart.    Again, thank you for allowing me to participate in the care of your patient.        Sincerely,        Umesh Mccray DO

## 2021-09-10 ENCOUNTER — HOSPITAL ENCOUNTER (EMERGENCY)
Dept: HOSPITAL 47 - EC | Age: 26
LOS: 1 days | Discharge: HOME | End: 2021-09-11
Payer: COMMERCIAL

## 2021-09-10 DIAGNOSIS — Z20.822: ICD-10-CM

## 2021-09-10 DIAGNOSIS — O99.513: ICD-10-CM

## 2021-09-10 DIAGNOSIS — O23.43: Primary | ICD-10-CM

## 2021-09-10 DIAGNOSIS — B97.4: ICD-10-CM

## 2021-09-10 DIAGNOSIS — Z3A.28: ICD-10-CM

## 2021-09-10 DIAGNOSIS — J40: ICD-10-CM

## 2021-09-10 LAB
PH UR: 7 [PH] (ref 5–8)
RBC UR QL: 1 /HPF (ref 0–5)
SP GR UR: 1.01 (ref 1–1.03)
SQUAMOUS UR QL AUTO: 2 /HPF (ref 0–4)
UROBILINOGEN UR QL STRIP: 3 MG/DL (ref ?–2)
WBC # UR AUTO: 6 /HPF (ref 0–5)

## 2021-09-10 PROCEDURE — 94640 AIRWAY INHALATION TREATMENT: CPT

## 2021-09-10 PROCEDURE — 87636 SARSCOV2 & INF A&B AMP PRB: CPT

## 2021-09-10 PROCEDURE — 99283 EMERGENCY DEPT VISIT LOW MDM: CPT

## 2021-09-10 PROCEDURE — 81001 URINALYSIS AUTO W/SCOPE: CPT

## 2021-09-10 NOTE — ED
URI HPI





- General


Chief Complaint: Upper Respiratory Infection


Stated Complaint: SOB,cough,weakness


Time Seen by Provider: 09/10/21 21:44


Source: patient, RN notes reviewed


Mode of arrival: ambulatory


Limitations: no limitations





- History of Present Illness


Initial Comments: 





26-year-old well-appearing female patient, alert and oriented 4, presents to [facility] emergency room with complaints of 4 days of cough with laryngitis.  She 

denies fevers.  She states that her children both have RSV she is 28 weeks 

pregnant and has been having this persistent cough.  She states that it is a dry

cough, occasionally she can get some sputum.  She denies any other medical 

history.  She has had nausea throughout the pregnancy similar to her other 2 

pregnancies.  She does feel fetal movement and denies any vaginal discharge or 

vaginal bleeding.  She states that she did talk to her OB/GYN who told her to 

come to the emergency room for breathing treatment.


MD Complaint: cough, nasal congestion


-: days(s) (4)


Severity scale (1-10): 0


Consistency: constant


Improves With: nothing


Context: sick contacts (kids with RSV)


Associated Symptoms: cough, nausea, hoarseness





- Related Data


                                  Previous Rx's











 Medication  Instructions  Recorded


 


Amoxicillin 500 mg PO Q8H 3 Days #9 cap 09/10/21











                                    Allergies











Allergy/AdvReac Type Severity Reaction Status Date / Time


 


No Known Allergies Allergy   Verified 09/10/21 21:42














Review of Systems


ROS Statement: 


Those systems with pertinent positive or pertinent negative responses have been 

documented in the HPI.





ROS Other: All systems not noted in ROS Statement are negative.





Past Medical History


Past Medical History: No Reported History


History of Any Multi-Drug Resistant Organisms: None Reported


Past Surgical History: No Surgical Hx Reported


Past Anesthesia/Blood Transfusion Reactions: No Reported Reaction


Past Psychological History: Anxiety, Depression


Smoking Status: Never smoker


Past Alcohol Use History: Occasional


Past Drug Use History: None Reported





- Past Family History


  ** Mother


Family Medical History: No Reported History





General Exam


Limitations: no limitations


General appearance: alert, in no apparent distress


Head exam: Present: atraumatic, normocephalic, normal inspection


Eye exam: Present: normal appearance, PERRL, EOMI.  Absent: scleral icterus, 

conjunctival injection, periorbital swelling


Pupils: Present: normal accommodation


ENT exam: Present: normal exam, normal oropharynx, mucous membranes moist


Neck exam: Present: normal inspection, full ROM.  Absent: tenderness, 

meningismus, lymphadenopathy, thyromegaly


Respiratory exam: Present: normal lung sounds bilaterally, other (Persistent dry

cough).  Absent: respiratory distress, wheezes, rales, rhonchi, stridor, chest 

wall tenderness, accessory muscle use, decreased breath sounds, prolonged 

expiratory


Cardiovascular Exam: Present: normal rhythm, tachycardia, normal heart sounds.  

Absent: systolic murmur, diastolic murmur, rubs, gallop, clicks, JVD


GI/Abdominal exam: Present: distended.  Absent: tenderness (28 weeks pregnant)


Extremities exam: Present: normal inspection, full ROM, normal capillary refill.

 Absent: tenderness, pedal edema, joint swelling, calf tenderness


Back exam: Present: normal inspection, full ROM.  Absent: muscle spasm, 

paraspinal tenderness, vertebral tenderness, rash noted


Neurological exam: Present: alert, oriented X3, CN II-XII intact, normal gait


Psychiatric exam: Present: normal affect, normal mood


Skin exam: Present: warm, dry, intact, normal color.  Absent: rash, cyanosis, 

diaphoretic, petechiae, pallor





Course


                                   Vital Signs











  09/10/21 09/11/21





  21:38 00:02


 


Temperature 98.2 F 97.7 F


 


Pulse Rate 124 H 92


 


Respiratory 22 18





Rate  


 


Blood Pressure 113/80 114/68


 


O2 Sat by Pulse 98 98





Oximetry  














Medical Decision Making





- Medical Decision Making





Patient tested negative for influenza A and B and Covid.  She is positive for 

RSV.  Lung sounds are clear to auscultation.  She is not in respiratory 

distress. She was given a DuoNeb treatment for her persistent cough.  Oxygen 

saturation prior to treatment was 98% and her heart rate was 92.  She has been 

afebrile.  Her urine was tested and revealed 6 WBC, small leukocyte esterase. 

Case discussed with Dr. Mac and she was treated for UTI with amoxicillin.  

She was instructed to follow-up with her primary care doctor this week and 

return to the emergency room with any new or worsening symptoms.  





- Lab Data


                                   Lab Results











  09/10/21 09/10/21 Range/Units





  22:19 22:19 


 


Urine Color   Yellow  


 


Urine Appearance   Cloudy H  (Clear)  


 


Urine pH   7.0  (5.0-8.0)  


 


Ur Specific Gravity   1.015  (1.001-1.035)  


 


Urine Protein   Negative  (Negative)  


 


Urine Glucose (UA)   Negative  (Negative)  


 


Urine Ketones   Negative  (Negative)  


 


Urine Blood   Negative  (Negative)  


 


Urine Nitrite   Negative  (Negative)  


 


Urine Bilirubin   Negative  (Negative)  


 


Urine Urobilinogen   3.0  (<2.0)  mg/dL


 


Ur Leukocyte Esterase   Small H  (Negative)  


 


Urine RBC   1  (0-5)  /hpf


 


Urine WBC   6 H  (0-5)  /hpf


 


Ur Squamous Epith Cells   2  (0-4)  /hpf


 


Amorphous Sediment   Rare H  (None)  /hpf


 


Urine Mucus   Few H  (None)  /hpf


 


Influenza Type A (PCR)  Not Detected   (Not Detectd)  


 


Influenza Type B (PCR)  Not Detected   (Not Detectd)  


 


RSV (PCR)  Detected A   (Not Detectd)  


 


SARS-CoV-2 (PCR)  Not Detected   (Not Detectd)  














Disposition


Clinical Impression: 


 RSV bronchitis, UTI (urinary tract infection) during pregnancy





Disposition: HOME SELF-CARE


Condition: Good


Instructions (If sedation given, give patient instructions):  Upper Respiratory 

Infection (ED)


Additional Instructions: 


Increase your fluid intake, take medication as prescribed for urinary tract i

nfection.  Follow-up with your primary care doctor in 1 week.  Return to the 

emergency room with any new or worsening symptoms including increased shortness 

of breath, abdominal pain or fevers.


Prescriptions: 


Amoxicillin 500 mg PO Q8H 3 Days #9 cap


Is patient prescribed a controlled substance at d/c from ED?: No


Referrals: 


None,Stated [Primary Care Provider] - 1-2 days


Time of Disposition: 00:28

## 2021-09-11 VITALS — RESPIRATION RATE: 18 BRPM | TEMPERATURE: 97.7 F

## 2021-09-11 VITALS — HEART RATE: 94 BPM | DIASTOLIC BLOOD PRESSURE: 73 MMHG | SYSTOLIC BLOOD PRESSURE: 104 MMHG

## 2021-11-18 ENCOUNTER — HOSPITAL ENCOUNTER (INPATIENT)
Dept: HOSPITAL 47 - FBPOP | Age: 26
LOS: 1 days | Discharge: HOME | End: 2021-11-19
Attending: OBSTETRICS & GYNECOLOGY | Admitting: OBSTETRICS & GYNECOLOGY
Payer: COMMERCIAL

## 2021-11-18 DIAGNOSIS — F32.A: ICD-10-CM

## 2021-11-18 DIAGNOSIS — F41.9: ICD-10-CM

## 2021-11-18 DIAGNOSIS — Z3A.38: ICD-10-CM

## 2021-11-18 DIAGNOSIS — Z67.91: ICD-10-CM

## 2021-11-18 LAB
BASOPHILS # BLD AUTO: 0.1 K/UL (ref 0–0.2)
BASOPHILS NFR BLD AUTO: 0 %
EOSINOPHIL # BLD AUTO: 0.4 K/UL (ref 0–0.7)
EOSINOPHIL NFR BLD AUTO: 4 %
ERYTHROCYTE [DISTWIDTH] IN BLOOD BY AUTOMATED COUNT: 3.62 M/UL (ref 3.8–5.4)
ERYTHROCYTE [DISTWIDTH] IN BLOOD: 13 % (ref 11.5–15.5)
HCT VFR BLD AUTO: 28.8 % (ref 34–46)
HGB BLD-MCNC: 9.5 GM/DL (ref 11.4–16)
LYMPHOCYTES # SPEC AUTO: 2.7 K/UL (ref 1–4.8)
LYMPHOCYTES NFR SPEC AUTO: 23 %
MCH RBC QN AUTO: 26.2 PG (ref 25–35)
MCHC RBC AUTO-ENTMCNC: 32.9 G/DL (ref 31–37)
MCV RBC AUTO: 79.7 FL (ref 80–100)
MONOCYTES # BLD AUTO: 0.7 K/UL (ref 0–1)
MONOCYTES NFR BLD AUTO: 6 %
NEUTROPHILS # BLD AUTO: 7.7 K/UL (ref 1.3–7.7)
NEUTROPHILS NFR BLD AUTO: 65 %
PLATELET # BLD AUTO: 359 K/UL (ref 150–450)
WBC # BLD AUTO: 11.9 K/UL (ref 3.8–10.6)

## 2021-11-18 PROCEDURE — 86850 RBC ANTIBODY SCREEN: CPT

## 2021-11-18 PROCEDURE — 86880 COOMBS TEST DIRECT: CPT

## 2021-11-18 PROCEDURE — 59025 FETAL NON-STRESS TEST: CPT

## 2021-11-18 PROCEDURE — 86901 BLOOD TYPING SEROLOGIC RH(D): CPT

## 2021-11-18 PROCEDURE — 85025 COMPLETE CBC W/AUTO DIFF WBC: CPT

## 2021-11-18 PROCEDURE — 99213 OFFICE O/P EST LOW 20 MIN: CPT

## 2021-11-18 PROCEDURE — 86870 RBC ANTIBODY IDENTIFICATION: CPT

## 2021-11-18 PROCEDURE — 86900 BLOOD TYPING SEROLOGIC ABO: CPT

## 2021-11-18 PROCEDURE — 84112 EVAL AMNIOTIC FLUID PROTEIN: CPT

## 2021-11-18 RX ADMIN — DOCUSATE SODIUM AND SENNOSIDES SCH: 50; 8.6 TABLET ORAL at 23:08

## 2021-11-18 RX ADMIN — POTASSIUM CHLORIDE SCH MLS/HR: 14.9 INJECTION, SOLUTION INTRAVENOUS at 05:44

## 2021-11-18 RX ADMIN — POTASSIUM CHLORIDE SCH: 14.9 INJECTION, SOLUTION INTRAVENOUS at 23:08

## 2021-11-18 NOTE — P.PROBDLV
Vaginal Delivery Note





- .


Vaginal Delivery Note: 





Normal vaginal delivery of viable male infant Apgars 9,9 at 0824 hrs





Please see dictated H&P for intimate details of this patients admission.  In 

brief summary, this is a pleasant 26 yr  female 38 2/7 weeks estimated 

gestational age who presented to L&D with complaints of SROM at ~2:30 this 

morning.  Cervix was 4 cm on admission.  She was not having regular contractions

therefore labor was augmented with Pitocin per protocol. She thereafter, 

progressed rapidly.  Received one dose of Stadol for pain control.  Once she got

complete, she pushed approximately 2 times and delivered the infants head over 

an intact perineum.  Presentation was straight occipit anterior.  Mouth and 

nares were bulb suctioned.  Nuchal cord times one was reduced easily.  With 

gentle downward traction we then had delivery of the anterior and posterior 

shoulder and the rest of the infants body.  This is a viable male infant, Apgars

9,9 at 0824hr.  Infant had spontaneous respiration and good cry.  After delivery

of the infant, the infant was laid on the mothers abdomen and after the cord was

done pulsating, double clamped and cut.  It appeared to be tri-vascular.  The 

placenta is then spontaneous delivered intact.  Estimated blood loss is ~150cc. 

There are no lacerations and no repair is required.  Infant and mother are 

stable in delivery room.  All counts correct x 3.  No complications.

## 2021-11-18 NOTE — P.HPOB
History of Present Illness


H&P Date: 21


Chief Complaint: Leaking of fluid





This patient is a pleasant 26-year-old  4 para 2 female estimated date of

confinement 2021 estimated gestational age 38-3/7 weeks who presents to 

labor and delivery with complaints of gush of fluid at about 2:30 this morning. 

Patient's prenatal care is per Dr. Duran.  It appears she had some 

hydronephrosis earlier in the pregnancy however this did resolve on serial 

ultrasounds.  Patient did start prenatal care at approximately 15 weeks.  

Pregnancy otherwise appears uncomplicated.





Review of Systems


Genitourinary: Reports pregnant


Menstruation: Reports amenorrhea





Past Medical History


Past Medical History: No Reported History


History of Any Multi-Drug Resistant Organisms: None Reported


Past Surgical History: No Surgical Hx Reported


Past Anesthesia/Blood Transfusion Reactions: No Reported Reaction


Past Psychological History: Anxiety, Depression


Smoking Status: Never smoker


Past Alcohol Use History: Occasional


Past Drug Use History: None Reported





- Past Family History


  ** Mother


Family Medical History: No Reported History





Medications and Allergies


                                Home Medications











 Medication  Instructions  Recorded  Confirmed  Type


 


No Known Home Medications  21 History








                                    Allergies











Allergy/AdvReac Type Severity Reaction Status Date / Time


 


No Known Allergies Allergy   Verified 09/10/21 21:42














Exam


                                   Vital Signs











  Temp Pulse Resp BP Pulse Ox


 


 21 03:46  98.2 F  113 H  16  121/69  98








                                Intake and Output











 21





 14:59 22:59 06:59


 


Other:   


 


  Weight   74.843 kg














- OBG Physical Exam


Abdomen: bowel sounds normal, no diffuse tenderness, no bruit present, no 

guarding noted, no hepatomegaly, no splenomegaly, no mass


Vulva: both: normal


Vagina: normal moisture, no discharge


Cervix: 





Cervix is 4-5 cm dilated and 70% effaced -2 station


Uterus: enlarged





Results





Prenatal blood work shows she is A-, rubella nonimmune, hepatitis B negative, 

RPR nonreactive, group B strep was negative, most recent growth ultrasound 

showed estimated fetal weight of 5 lbs. 1 oz. which is the 25th to the 50th 

percentile.,  Patient did receive RhoGAM on .





Assessment and Plan


Assessment: 





This is a pleasant 26-year-old  4 para 2 female 38-3/7 weeks gestation 

with spontaneous rupture membranes.  Patient is having very irregular 

contractions therefore were going to proceed with augmentation of labor with 

Pitocin and anticipate vaginal delivery.


(1) 38 weeks gestation of pregnancy


Current Visit: Yes   Status: Acute   Code(s): Z3A.38 - 38 WEEKS GESTATION OF 

PREGNANCY   SNOMED Code(s): 09398267


   





(2) Spontaneous rupture of amniotic membranes


Current Visit: Yes   Status: Acute   Code(s): ITH1058 -    SNOMED Code(s): 

409131854


   





(3) Rh negative status during pregnancy


Current Visit: Yes   Status: Acute   Code(s): O26.899 - OTH PREGNANCY RELATED 

CONDITIONS, UNSPECIFIED TRIMESTER; Z67.91 - UNSPECIFIED BLOOD TYPE, RH NEGATIVE 

 SNOMED Code(s): 232583264

## 2021-11-19 VITALS
SYSTOLIC BLOOD PRESSURE: 96 MMHG | TEMPERATURE: 97.9 F | RESPIRATION RATE: 16 BRPM | DIASTOLIC BLOOD PRESSURE: 64 MMHG | HEART RATE: 80 BPM

## 2021-11-19 RX ADMIN — DOCUSATE SODIUM AND SENNOSIDES SCH: 50; 8.6 TABLET ORAL at 11:12

## 2021-11-19 NOTE — P.DS
Providers


Date of admission: 


21 03:44





Expected date of discharge: 21


Attending physician: 


Reji Stearns





Primary care physician: 


Stated None








- Discharge Diagnosis(es)


(1) 38 weeks gestation of pregnancy


Current Visit: Yes   Status: Acute   





(2) Spontaneous rupture of amniotic membranes


Current Visit: Yes   Status: Acute   





(3) Rh negative status during pregnancy


Current Visit: Yes   Status: Acute   


Hospital Course: 





Please see dictated H&P for intimate details of this patient's admission.  Brief

summary this pleasant 26-year-old  4 para 2 female 38-3/7 weeks gestation

admitted to labor and delivery spontaneous rupture membranes and labor.  Patient

quickly goes on have a vaginal delivery viable male infant.  Please see dictated

delivery note.  Postpartum day #1 patient's doing well wishes to go home felt be

stable for discharge home follow up with Dr. Stearns in 6 weeks.


Procedures: 





Normal vaginal delivery.


Patient Condition at Discharge: Good





Plan - Discharge Summary


New Discharge Prescriptions: 


New


   Ibuprofen [Motrin] 600 mg PO Q6HR PRN #30 tab


     PRN Reason: Mild Pain (Scale 1 To 3)


Discharge Medication List





Ibuprofen [Motrin] 600 mg PO Q6HR PRN #30 tab 21 [Rx]








Follow up Appointment(s)/Referral(s): 


Reji Stearns DO [Doctor of Osteopathic Medicine] - 21 11:00 am


Patient Instructions/Handouts:  Vaginal Delivery (DC)


Activity/Diet/Wound Care/Special Instructions: 


No intercourse or anything per vagina for 6 weeks.  Please call if any fever, 

chills, excessive vaginal bleeding, and/or abdominal pain.


Discharge Disposition: HOME SELF-CARE

## 2021-11-19 NOTE — P.PNOBGVD
Subjective





- Subjective


Patient reports: Reports appetite normal, Reports voiding normally, Reports pain

well controlled, Reports ambulating normally


[place]: doing well





Objective





- Latest Vital Signs


Latest vital signs: 


                                   Vital Signs











  Temp Pulse Resp BP Pulse Ox


 


 21 00:00  98.2 F  70  18  99/67  98


 


 21 20:00  98.1 F  80  16  91/57 


 


 21 16:00  98.1 F  81  16  100/56 


 


 21 12:00  97.7 F  81  16  102/64 


 


 21 10:51  98.2 F  88  16  104/60 


 


 21 10:21   76  16  90/50 


 


 21 09:51   86  16  93/51 


 


 21 09:36  98 F  76  16  90/50 


 


 21 09:21   74  16  91/55 


 


 21 09:06   90  16  98/66 


 


 21 08:51   84  16  98/61 


 


 21 06:14  98.2 F  113 H  16  121/69  98








                                Intake and Output











 21





 14:59 22:59 06:59


 


Intake Total  250 


 


Output Total 800  


 


Balance -800 250 


 


Intake:   


 


  Oral  250 


 


Output:   


 


  Urine 800  


 


Other:   


 


  # Voids 1  














- Exam


Lungs: bilateral: normal


Chest: Normal S1, Normal S2


Extremities: Present: normal


Abdomen: Present: normal appearance, soft


Uterus: Present: normal, firm





Assessment and Plan


Assessment: 





Postpartum day #1.  Patient is resting without complaints and wishes to go home.

 Vital signs are stable and she is afebrile.  Uterus is firm nontender and she 

is having normal lochia.  My impression this is a normal postpartum course.  

Plan is to continue routine postpartum care discharge home later today


(1) 38 weeks gestation of pregnancy


Current Visit: Yes   Status: Acute   Code(s): Z3A.38 - 38 WEEKS GESTATION OF 

PREGNANCY   SNOMED Code(s): 73980755


   





(2) Spontaneous rupture of amniotic membranes


Current Visit: Yes   Status: Acute   Code(s): SGA2676 -    SNOMED Code(s): 

177676642


   





(3) Rh negative status during pregnancy


Current Visit: Yes   Status: Acute   Code(s): O26.899 - OTH PREGNANCY RELATED 

CONDITIONS, UNSPECIFIED TRIMESTER; Z67.91 - UNSPECIFIED BLOOD TYPE, RH NEGATIVE 

 SNOMED Code(s): 045749381

## 2022-07-20 ENCOUNTER — HOSPITAL ENCOUNTER (EMERGENCY)
Dept: HOSPITAL 47 - EC | Age: 27
Discharge: HOME | End: 2022-07-20
Payer: COMMERCIAL

## 2022-07-20 VITALS — SYSTOLIC BLOOD PRESSURE: 117 MMHG | RESPIRATION RATE: 16 BRPM | DIASTOLIC BLOOD PRESSURE: 62 MMHG

## 2022-07-20 VITALS — HEART RATE: 78 BPM | TEMPERATURE: 98.1 F

## 2022-07-20 DIAGNOSIS — R10.31: Primary | ICD-10-CM

## 2022-07-20 LAB
ALBUMIN SERPL-MCNC: 4.9 G/DL (ref 3.5–5)
ALP SERPL-CCNC: 65 U/L (ref 38–126)
ALT SERPL-CCNC: 11 U/L (ref 4–34)
AMYLASE SERPL-CCNC: 82 U/L (ref 30–110)
ANION GAP SERPL CALC-SCNC: 10 MMOL/L
AST SERPL-CCNC: 22 U/L (ref 14–36)
BASOPHILS # BLD AUTO: 0.1 K/UL (ref 0–0.2)
BASOPHILS NFR BLD AUTO: 1 %
BUN SERPL-SCNC: 19 MG/DL (ref 7–17)
CALCIUM SPEC-MCNC: 9.4 MG/DL (ref 8.4–10.2)
CHLORIDE SERPL-SCNC: 103 MMOL/L (ref 98–107)
CO2 SERPL-SCNC: 25 MMOL/L (ref 22–30)
EOSINOPHIL # BLD AUTO: 0.8 K/UL (ref 0–0.7)
EOSINOPHIL NFR BLD AUTO: 7 %
ERYTHROCYTE [DISTWIDTH] IN BLOOD BY AUTOMATED COUNT: 4.34 M/UL (ref 3.8–5.4)
ERYTHROCYTE [DISTWIDTH] IN BLOOD: 13.4 % (ref 11.5–15.5)
GLUCOSE SERPL-MCNC: 83 MG/DL (ref 74–99)
HCT VFR BLD AUTO: 38.7 % (ref 34–46)
HGB BLD-MCNC: 12.1 GM/DL (ref 11.4–16)
LIPASE SERPL-CCNC: 160 U/L (ref 23–300)
LYMPHOCYTES # SPEC AUTO: 2.5 K/UL (ref 1–4.8)
LYMPHOCYTES NFR SPEC AUTO: 22 %
MCH RBC QN AUTO: 28 PG (ref 25–35)
MCHC RBC AUTO-ENTMCNC: 31.4 G/DL (ref 31–37)
MCV RBC AUTO: 89.2 FL (ref 80–100)
MONOCYTES # BLD AUTO: 0.4 K/UL (ref 0–1)
MONOCYTES NFR BLD AUTO: 4 %
NEUTROPHILS # BLD AUTO: 7.2 K/UL (ref 1.3–7.7)
NEUTROPHILS NFR BLD AUTO: 65 %
PH UR: 6.5 [PH] (ref 5–8)
PLATELET # BLD AUTO: 317 K/UL (ref 150–450)
POTASSIUM SERPL-SCNC: 4.1 MMOL/L (ref 3.5–5.1)
PROT SERPL-MCNC: 7.9 G/DL (ref 6.3–8.2)
SODIUM SERPL-SCNC: 138 MMOL/L (ref 137–145)
SP GR UR: 1.02 (ref 1–1.03)
UROBILINOGEN UR QL STRIP: <2 MG/DL (ref ?–2)
WBC # BLD AUTO: 11.1 K/UL (ref 3.8–10.6)

## 2022-07-20 PROCEDURE — 80053 COMPREHEN METABOLIC PANEL: CPT

## 2022-07-20 PROCEDURE — 83605 ASSAY OF LACTIC ACID: CPT

## 2022-07-20 PROCEDURE — 99284 EMERGENCY DEPT VISIT MOD MDM: CPT

## 2022-07-20 PROCEDURE — 74177 CT ABD & PELVIS W/CONTRAST: CPT

## 2022-07-20 PROCEDURE — 81003 URINALYSIS AUTO W/O SCOPE: CPT

## 2022-07-20 PROCEDURE — 85025 COMPLETE CBC W/AUTO DIFF WBC: CPT

## 2022-07-20 PROCEDURE — 81025 URINE PREGNANCY TEST: CPT

## 2022-07-20 PROCEDURE — 83690 ASSAY OF LIPASE: CPT

## 2022-07-20 PROCEDURE — 82150 ASSAY OF AMYLASE: CPT

## 2022-07-20 PROCEDURE — 36415 COLL VENOUS BLD VENIPUNCTURE: CPT

## 2022-07-20 NOTE — ED
Abdominal Pain HPI





- General


Chief Complaint: Abdominal Pain


Stated Complaint: pelvic pain


Time Seen by Provider: 07/20/22 15:52


Source: patient


Mode of arrival: ambulatory


Limitations: no limitations





- History of Present Illness


Initial Comments: 


Patient is a 27-year-old  female presents the emergency room with 

complaints of mild right lower quadrant pain more in her adnexal region. She 

reports that she has chronic intermittent abdominal pain in various quadrants 

and was not concerned regarding her pain however her spouse encouraged her to 

present to the emergency room as he has had a recent bout of appendicitis. She 

denies any associated symptoms including any nausea, vomiting, diarrhea, 

abdominal cramping, fevers, chills, body aches, chest pain, or shortness of 

breath. She denies any dysuria hematuria or flank pain. She reports that she is 

likely ovulating and will be starting her menstrual cycle in approximately 2 

weeks and denies any concerns for pregnancy. She denies any other chronic 

medical conditions and takes no medications on a regular basis.








- Related Data


                                  Previous Rx's











 Medication  Instructions  Recorded


 


Ibuprofen [Motrin] 600 mg PO Q6HR PRN #30 tab 11/19/21











                                    Allergies











Allergy/AdvReac Type Severity Reaction Status Date / Time


 


No Known Allergies Allergy   Verified 07/20/22 13:43














Review of Systems


ROS Statement: 


Those systems with pertinent positive or pertinent negative responses have been 

documented in the HPI.





ROS Other: All systems not noted in ROS Statement are negative.





Past Medical History


Past Medical History: No Reported History


History of Any Multi-Drug Resistant Organisms: None Reported


Past Surgical History: No Surgical Hx Reported


Past Anesthesia/Blood Transfusion Reactions: No Reported Reaction


Past Psychological History: Anxiety, Depression


Smoking Status: Never smoker


Past Alcohol Use History: Occasional


Past Drug Use History: None Reported





- Past Family History


  ** Mother


Family Medical History: No Reported History





General Exam


Limitations: no limitations


General appearance: alert, in no apparent distress


Head exam: Present: atraumatic, normocephalic, normal inspection


Eye exam: Present: normal appearance, PERRL, EOMI.  Absent: scleral icterus, 

conjunctival injection, periorbital swelling


ENT exam: Present: normal exam, mucous membranes moist


Neck exam: Present: normal inspection.  Absent: tenderness, meningismus, 

lymphadenopathy


Respiratory exam: Present: normal lung sounds bilaterally.  Absent: respiratory 

distress, wheezes, rales, rhonchi, stridor


Cardiovascular Exam: Present: regular rate, normal rhythm, normal heart sounds. 

 Absent: systolic murmur, diastolic murmur, rubs, gallop, clicks


GI/Abdominal exam: Present: soft, tenderness (Mild lower aspect of right lower 

quadrant near adnexal region.), normal bowel sounds.  Absent: distended, 

guarding, rebound, rigid, organomegaly, mass


Rectal exam: Present: deferred


Extremities exam: Present: normal inspection


Back exam: Present: normal inspection


Neurological exam: Present: alert, oriented X3, CN II-XII intact


Psychiatric exam: Present: normal affect, normal mood


Skin exam: Present: warm, dry, intact, normal color.  Absent: rash





Course


                                   Vital Signs











  07/20/22





  13:41


 


Temperature 98.1 F


 


Pulse Rate 78


 


Respiratory 20





Rate 


 


Blood Pressure 112/71


 


O2 Sat by Pulse 99





Oximetry 














Medical Decision Making





- Medical Decision Making





Due to sudden onset of right lower quadrant will check computed tomography scan 

to rule out appendicitis. Will check CBC along with CMP to rule out infectious 

process and other GI etiologies along with urinalysis to rule out urinary tract 

infection. Prior to imaging will check urine for beta hCG to rule out pregnancy.

 She denies any analgesic or antibiotic need at this time.





Computed tomography scan negative for acute processes. Trace amount of free 

fluid noted in the pelvic region.





Mild leukocytosis noted on blood work however she has chronic mild leukocytosis 

which is near her baseline level and less than her last labs. No other 

significant anomalies on her blood work. Urinalysis negative. Pain will 

continues to remain at a low level IVV and mild denying any analgesic need. She

 is agreeable to be discharged home with close follow-up with her primary care 

provider or return to the emergency room if symptoms worsen.





Case discussed with Dr. Jackson.





- Lab Data


Result diagrams: 


                                07/20/22 Unknown





                                 07/20/22 16:32


                                   Lab Results











  07/20/22 07/20/22 07/20/22 Range/Units





  16:32 16:32 16:32 


 


WBC     (3.8-10.6)  k/uL


 


RBC     (3.80-5.40)  m/uL


 


Hgb     (11.4-16.0)  gm/dL


 


Hct     (34.0-46.0)  %


 


MCV     (80.0-100.0)  fL


 


MCH     (25.0-35.0)  pg


 


MCHC     (31.0-37.0)  g/dL


 


RDW     (11.5-15.5)  %


 


Plt Count     (150-450)  k/uL


 


MPV     


 


Neutrophils %     %


 


Lymphocytes %     %


 


Monocytes %     %


 


Eosinophils %     %


 


Basophils %     %


 


Neutrophils #     (1.3-7.7)  k/uL


 


Lymphocytes #     (1.0-4.8)  k/uL


 


Monocytes #     (0-1.0)  k/uL


 


Eosinophils #     (0-0.7)  k/uL


 


Basophils #     (0-0.2)  k/uL


 


Sodium    138  (137-145)  mmol/L


 


Potassium    4.1  (3.5-5.1)  mmol/L


 


Chloride    103  ()  mmol/L


 


Carbon Dioxide    25  (22-30)  mmol/L


 


Anion Gap    10  mmol/L


 


BUN    19 H  (7-17)  mg/dL


 


Creatinine    0.61  (0.52-1.04)  mg/dL


 


Est GFR (CKD-EPI)AfAm    >90  (>60 ml/min/1.73 sqM)  


 


Est GFR (CKD-EPI)NonAf    >90  (>60 ml/min/1.73 sqM)  


 


Glucose    83  (74-99)  mg/dL


 


Plasma Lactic Acid Geovanni     (0.7-2.0)  mmol/L


 


Calcium    9.4  (8.4-10.2)  mg/dL


 


Total Bilirubin    0.6  (0.2-1.3)  mg/dL


 


AST    22  (14-36)  U/L


 


ALT    11  (4-34)  U/L


 


Alkaline Phosphatase    65  ()  U/L


 


Total Protein    7.9  (6.3-8.2)  g/dL


 


Albumin    4.9  (3.5-5.0)  g/dL


 


Amylase    82  ()  U/L


 


Lipase    160  ()  U/L


 


Urine Color  Yellow    


 


Urine Appearance  Clear    (Clear)  


 


Urine pH  6.5    (5.0-8.0)  


 


Ur Specific Gravity  1.024    (1.001-1.035)  


 


Urine Protein  Negative    (Negative)  


 


Urine Glucose (UA)  Negative    (Negative)  


 


Urine Ketones  Negative    (Negative)  


 


Urine Blood  Negative    (Negative)  


 


Urine Nitrite  Negative    (Negative)  


 


Urine Bilirubin  Negative    (Negative)  


 


Urine Urobilinogen  <2.0    (<2.0)  mg/dL


 


Ur Leukocyte Esterase  Negative    (Negative)  


 


Urine HCG, Qual   Not Detected   (Not Detectd)  














  07/20/22 07/20/22 Range/Units





  16:32 Unknown 


 


WBC   11.1 H  (3.8-10.6)  k/uL


 


RBC   4.34  (3.80-5.40)  m/uL


 


Hgb   12.1  (11.4-16.0)  gm/dL


 


Hct   38.7  (34.0-46.0)  %


 


MCV   89.2  (80.0-100.0)  fL


 


MCH   28.0  (25.0-35.0)  pg


 


MCHC   31.4  (31.0-37.0)  g/dL


 


RDW   13.4  (11.5-15.5)  %


 


Plt Count   317  (150-450)  k/uL


 


MPV   7.6  


 


Neutrophils %   65  %


 


Lymphocytes %   22  %


 


Monocytes %   4  %


 


Eosinophils %   7  %


 


Basophils %   1  %


 


Neutrophils #   7.2  (1.3-7.7)  k/uL


 


Lymphocytes #   2.5  (1.0-4.8)  k/uL


 


Monocytes #   0.4  (0-1.0)  k/uL


 


Eosinophils #   0.8 H  (0-0.7)  k/uL


 


Basophils #   0.1  (0-0.2)  k/uL


 


Sodium    (137-145)  mmol/L


 


Potassium    (3.5-5.1)  mmol/L


 


Chloride    ()  mmol/L


 


Carbon Dioxide    (22-30)  mmol/L


 


Anion Gap    mmol/L


 


BUN    (7-17)  mg/dL


 


Creatinine    (0.52-1.04)  mg/dL


 


Est GFR (CKD-EPI)AfAm    (>60 ml/min/1.73 sqM)  


 


Est GFR (CKD-EPI)NonAf    (>60 ml/min/1.73 sqM)  


 


Glucose    (74-99)  mg/dL


 


Plasma Lactic Acid Geovanni  0.7   (0.7-2.0)  mmol/L


 


Calcium    (8.4-10.2)  mg/dL


 


Total Bilirubin    (0.2-1.3)  mg/dL


 


AST    (14-36)  U/L


 


ALT    (4-34)  U/L


 


Alkaline Phosphatase    ()  U/L


 


Total Protein    (6.3-8.2)  g/dL


 


Albumin    (3.5-5.0)  g/dL


 


Amylase    ()  U/L


 


Lipase    ()  U/L


 


Urine Color    


 


Urine Appearance    (Clear)  


 


Urine pH    (5.0-8.0)  


 


Ur Specific Gravity    (1.001-1.035)  


 


Urine Protein    (Negative)  


 


Urine Glucose (UA)    (Negative)  


 


Urine Ketones    (Negative)  


 


Urine Blood    (Negative)  


 


Urine Nitrite    (Negative)  


 


Urine Bilirubin    (Negative)  


 


Urine Urobilinogen    (<2.0)  mg/dL


 


Ur Leukocyte Esterase    (Negative)  


 


Urine HCG, Qual    (Not Detectd)  














- Radiology Data


Radiology results: report reviewed, image reviewed


Computed tomography scan abdomen pelvis with contrast shows a small amount of 

low-density fluid in the pelvis which could be physiological otherwise negative 

exam.





Disposition


Clinical Impression: 


 Right lower quadrant pain





Disposition: HOME SELF-CARE


Condition: Good


Instructions (If sedation given, give patient instructions):  Abdominal Pain 

(ED)


Additional Instructions: 


Please utilize ibuprofen or Tylenol as needed for pain. Drink plenty of fluids. 

Please return to the emergency room if symptoms seem to worsen any other 

associated symptoms including nausea vomiting or diarrhea occurs with already 

occurring symptoms. Please follow-up with your primary care provider. Please 

return to the Emergency Department if symptoms worsen or any other concerns.


Is patient prescribed a controlled substance at d/c from ED?: No


Referrals: 


None,Stated [Primary Care Provider] - 1-2 days


Time of Disposition: 18:32

## 2022-07-20 NOTE — CT
EXAMINATION TYPE: CT abdomen pelvis w con

 

DATE OF EXAM: 7/20/2022

 

COMPARISON: None

 

HISTORY: RLQ pain

 

CT DLP: 597.8 mGycm

Automated exposure control for dose reduction was used.

 

CONTRAST: 

Performed with IV Contrast, patient injected with 100 mL of Isovue 300.

 

Lung bases are clear. No pleural effusion. Heart size is normal. There is mild pectus excavatum chest
 deformity. No pericardial effusion.

 

Liver spleen stomach pancreas gallbladder appear intact. The bowel gas are not dilated.

 

There is no adrenal mass. Kidneys show satisfactory contrast opacification. There is no hydronephrosi
s. Ureters are not dilated. There is no retroperitoneal adenopathy. Bladder distends smoothly. Uterus
 is anteverted. There is tiny amount of low-density fluid in the pelvis. No pelvic mass.

 

The lumbar vertebrae have normal spacing and alignment. No compression fracture. Posterior elements a
re intact. The bony pelvis is intact. Hip joints are intact.

 

There is no mesenteric edema. No ascites or free air. No bowel obstruction. Appendix not clearly seen
. No sign of thickened appendix. Delayed images show normal renal excretion.

 

IMPRESSION:

Small amount of low-density fluid in the pelvis could be physiologic. Otherwise negative exam.